# Patient Record
Sex: MALE | Race: WHITE | URBAN - METROPOLITAN AREA
[De-identification: names, ages, dates, MRNs, and addresses within clinical notes are randomized per-mention and may not be internally consistent; named-entity substitution may affect disease eponyms.]

---

## 2019-01-07 PROBLEM — Z79.899 OTHER LONG TERM (CURRENT) DRUG THERAPY: Status: ACTIVE | Noted: 2019-01-07

## 2019-01-07 PROBLEM — Z98.890 OTHER SPECIFIED POSTPROCEDURAL STATES: Status: ACTIVE | Noted: 2019-01-07

## 2019-01-07 PROBLEM — S86.212A: Status: ACTIVE | Noted: 2019-01-07

## 2019-01-07 PROBLEM — M75.101 UNSPECIFIED ROTATOR CUFF TEAR OR RUPTURE OF RIGHT SHOULDER, NOT SPECIFIED AS TRAUMATIC: Status: ACTIVE | Noted: 2019-01-07

## 2019-01-07 PROBLEM — Z96.659 PRESENCE OF UNSPECIFIED ARTIFICIAL KNEE JOINT: Status: ACTIVE | Noted: 2019-01-07

## 2019-04-11 PROBLEM — K21.9 GASTRO-ESOPHAGEAL REFLUX DISEASE WITHOUT ESOPHAGITIS: Status: ACTIVE | Noted: 2019-04-11

## 2020-06-08 PROBLEM — S76.311A STRAIN OF MUSCLE, FASCIA AND TENDON OF THE POSTERIOR MUSCLE GROUP AT THIGH LEVEL, RIGHT THIGH, INITIAL ENCOUNTER: Status: ACTIVE | Noted: 2020-06-08

## 2020-08-31 PROBLEM — I48.91 UNSPECIFIED ATRIAL FIBRILLATION: Status: ACTIVE | Noted: 2020-08-31

## 2020-11-18 PROBLEM — M79.641 PAIN IN RIGHT HAND: Status: ACTIVE | Noted: 2020-11-18

## 2021-01-19 PROBLEM — R29.898 OTHER SYMPTOMS AND SIGNS INVOLVING THE MUSCULOSKELETAL SYSTEM: Status: ACTIVE | Noted: 2021-01-19

## 2021-01-19 PROBLEM — M25.641 STIFFNESS OF RIGHT HAND, NOT ELSEWHERE CLASSIFIED: Status: ACTIVE | Noted: 2021-01-19

## 2021-05-03 PROBLEM — M25.532 PAIN IN LEFT WRIST: Status: ACTIVE | Noted: 2021-05-03

## 2021-05-03 PROBLEM — M19.042 PRIMARY OSTEOARTHRITIS, LEFT HAND: Status: ACTIVE | Noted: 2021-05-03

## 2021-05-03 PROBLEM — M18.12 UNILATERAL PRIMARY OSTEOARTHRITIS OF FIRST CARPOMETACARPAL JOINT, LEFT HAND: Status: ACTIVE | Noted: 2021-05-03

## 2021-09-08 PROBLEM — M25.511 PAIN IN RIGHT SHOULDER: Status: ACTIVE | Noted: 2021-09-08

## 2021-11-19 PROBLEM — Z00.00 ENCOUNTER FOR GENERAL ADULT MEDICAL EXAMINATION WITHOUT ABNORMAL FINDINGS: Status: ACTIVE | Noted: 2021-11-19

## 2022-06-09 PROBLEM — G47.00 INSOMNIA, UNSPECIFIED: Status: ACTIVE | Noted: 2022-06-09

## 2022-06-09 PROBLEM — D68.69 OTHER THROMBOPHILIA: Status: ACTIVE | Noted: 2022-06-09

## 2022-06-09 PROBLEM — M79.18 MYALGIA, OTHER SITE: Status: ACTIVE | Noted: 2022-06-09

## 2022-06-09 PROBLEM — I10 ESSENTIAL (PRIMARY) HYPERTENSION: Status: ACTIVE | Noted: 2022-06-09

## 2022-06-09 PROBLEM — N40.0 BENIGN PROSTATIC HYPERPLASIA WITHOUT LOWER URINARY TRACT SYMPTOMS: Status: ACTIVE | Noted: 2022-06-09

## 2023-01-09 PROBLEM — M19.011 PRIMARY OSTEOARTHRITIS, RIGHT SHOULDER: Status: ACTIVE | Noted: 2023-01-09

## 2023-10-17 ENCOUNTER — APPOINTMENT (RX ONLY)
Dept: URBAN - METROPOLITAN AREA CLINIC 33 | Facility: CLINIC | Age: 73
Setting detail: DERMATOLOGY
End: 2023-10-17

## 2023-10-17 DIAGNOSIS — D22 MELANOCYTIC NEVI: ICD-10-CM

## 2023-10-17 DIAGNOSIS — D18.0 HEMANGIOMA: ICD-10-CM

## 2023-10-17 DIAGNOSIS — T07XXXA ABRASION OR FRICTION BURN OF OTHER, MULTIPLE, AND UNSPECIFIED SITES, WITHOUT MENTION OF INFECTION: ICD-10-CM

## 2023-10-17 DIAGNOSIS — L82.1 OTHER SEBORRHEIC KERATOSIS: ICD-10-CM

## 2023-10-17 PROBLEM — S10.80XA UNSPECIFIED SUPERFICIAL INJURY OF OTHER SPECIFIED PART OF NECK, INITIAL ENCOUNTER: Status: ACTIVE | Noted: 2023-10-17

## 2023-10-17 PROBLEM — D18.01 HEMANGIOMA OF SKIN AND SUBCUTANEOUS TISSUE: Status: ACTIVE | Noted: 2023-10-17

## 2023-10-17 PROBLEM — D22.5 MELANOCYTIC NEVI OF TRUNK: Status: ACTIVE | Noted: 2023-10-17

## 2023-10-17 PROCEDURE — 99203 OFFICE O/P NEW LOW 30 MIN: CPT

## 2023-10-17 PROCEDURE — ? COUNSELING

## 2023-10-17 ASSESSMENT — LOCATION ZONE DERM
LOCATION ZONE: NECK
LOCATION ZONE: ARM
LOCATION ZONE: TRUNK

## 2023-10-17 ASSESSMENT — LOCATION DETAILED DESCRIPTION DERM
LOCATION DETAILED: LEFT SUPERIOR MEDIAL MIDBACK
LOCATION DETAILED: LEFT RIB CAGE
LOCATION DETAILED: LEFT POSTERIOR NECK
LOCATION DETAILED: MID POSTERIOR NECK
LOCATION DETAILED: RIGHT ANTERIOR SHOULDER
LOCATION DETAILED: PERIUMBILICAL SKIN

## 2023-10-17 ASSESSMENT — LOCATION SIMPLE DESCRIPTION DERM
LOCATION SIMPLE: POSTERIOR NECK
LOCATION SIMPLE: LEFT LOWER BACK
LOCATION SIMPLE: ABDOMEN
LOCATION SIMPLE: RIGHT SHOULDER

## 2023-10-17 NOTE — PROCEDURE: COUNSELING
Sunscreen Recommendations: It was recommend that sunscreens be applied on a daily basis. This should be applied an hour before exposure and then every two hours if out for an extended period of time. SPF 30 or greater is recommended. There are mineral sunscreens and chemical sun screens. The mineral sunscreens sometimes are difficult to use due to the white color. Top rated sunscreen include La Roche Posay, Blue Lizzard, Elta MD and Neutrogena Ultra Dry.
Sunscreen Recommendation Label Override: SPF 30
Detail Level: Simple
Petrolatum Recommendations: Petrolatum can be applied daily to prevent the area from drying out. Vaseline can be purchased over the counter in most drug stores.
Patient Specific Counseling (Will Not Stick From Patient To Patient): **I did not see any worrisome features. He picked at the area. He may of had an inflamed hair follicle.